# Patient Record
Sex: MALE
[De-identification: names, ages, dates, MRNs, and addresses within clinical notes are randomized per-mention and may not be internally consistent; named-entity substitution may affect disease eponyms.]

---

## 2022-07-24 ENCOUNTER — NURSE TRIAGE (OUTPATIENT)
Dept: OTHER | Facility: CLINIC | Age: 27
End: 2022-07-24

## 2022-07-24 NOTE — TELEPHONE ENCOUNTER
Subjective: Caller states \"headache and stiff neck\"     Current Symptoms: Fever 99.5, Headache, stiff neck. States he can touch his chin to his chest but cannot turn to his shoulders or look up. State pain seems worse at night and goes to an 8/10. Seems better in the morning but pain is then in his back then goes back to his head when he gets ups. Onset: 4 days ago; sudden    Associated Symptoms: NA    Pain Severity: 6/10; aching; constant    Temperature: 99.8 orally    What has been tried: Hasn't tried anything for this yet. Has been drinking tumeric powder. LMP: NA Pregnant: NA    Recommended disposition: Go to ED Now    Care advice provided, patient verbalizes understanding; denies any other questions or concerns; instructed to call back for any new or worsening symptoms. Patient/caller agrees to proceed to [facility] Emergency Department    This triage is a result of a call to [address] [place] [address]. Please do not respond to the triage nurse through this encounter. Any subsequent communication should be directly with the patient.     Reason for Disposition   [1] SEVERE headache AND [2] fever    Protocols used: Headache-ADULT-AH